# Patient Record
Sex: MALE | Race: WHITE | NOT HISPANIC OR LATINO
[De-identification: names, ages, dates, MRNs, and addresses within clinical notes are randomized per-mention and may not be internally consistent; named-entity substitution may affect disease eponyms.]

---

## 2017-03-12 ENCOUNTER — TRANSCRIPTION ENCOUNTER (OUTPATIENT)
Age: 47
End: 2017-03-12

## 2020-01-22 ENCOUNTER — TRANSCRIPTION ENCOUNTER (OUTPATIENT)
Age: 50
End: 2020-01-22

## 2020-11-21 ENCOUNTER — TRANSCRIPTION ENCOUNTER (OUTPATIENT)
Age: 50
End: 2020-11-21

## 2021-05-23 ENCOUNTER — TRANSCRIPTION ENCOUNTER (OUTPATIENT)
Age: 51
End: 2021-05-23

## 2022-05-18 ENCOUNTER — NON-APPOINTMENT (OUTPATIENT)
Age: 52
End: 2022-05-18

## 2022-10-12 ENCOUNTER — APPOINTMENT (OUTPATIENT)
Dept: ORTHOPEDIC SURGERY | Facility: CLINIC | Age: 52
End: 2022-10-12

## 2022-10-12 VITALS — HEIGHT: 70 IN | BODY MASS INDEX: 27.49 KG/M2 | WEIGHT: 192 LBS

## 2022-10-12 DIAGNOSIS — M70.41 PREPATELLAR BURSITIS, RIGHT KNEE: ICD-10-CM

## 2022-10-12 DIAGNOSIS — Z78.9 OTHER SPECIFIED HEALTH STATUS: ICD-10-CM

## 2022-10-12 PROBLEM — Z00.00 ENCOUNTER FOR PREVENTIVE HEALTH EXAMINATION: Status: ACTIVE | Noted: 2022-10-12

## 2022-10-12 PROCEDURE — 73564 X-RAY EXAM KNEE 4 OR MORE: CPT | Mod: RT

## 2022-10-12 PROCEDURE — 99203 OFFICE O/P NEW LOW 30 MIN: CPT

## 2022-10-12 RX ORDER — CEPHALEXIN 500 MG/1
500 CAPSULE ORAL TWICE DAILY
Qty: 14 | Refills: 0 | Status: COMPLETED | COMMUNITY
Start: 2022-10-12 | End: 2022-10-19

## 2022-10-12 NOTE — IMAGING
[de-identified] : Small anterior warmth and erythema\par Anterior tenderness to palpation\par Range of motion 0-140; anterior pain with flexion; tight hamstrings\par 5/5 quadriceps and hamstring strength\par Negative Lachman, negative Armand, negative patella apprehension\par Motor and sensory intact distally\par Non-antalgic gait\par  [Right] : right knee [All Views] : anteroposterior, lateral, skyline, and anteroposterior standing [There are no fractures, subluxations or dislocations. No significant abnormalities are seen] : There are no fractures, subluxations or dislocations. No significant abnormalities are seen

## 2022-10-12 NOTE — ASSESSMENT
[FreeTextEntry1] : EVOLVING SEPTIC PREPATELLAR BURSITIS/CELLULITIS X 2 DAYS\par NO SYSTEMIC SYMPTOMS\par COURSE OF KEFLEX X 7 DAYS AND F/U THEN\par ADVISED TO PROCEED TO ER IF DEVELOPS WORSENING REDNESS OR SYSTEMIC SYMPTOMS

## 2022-10-12 NOTE — HISTORY OF PRESENT ILLNESS
[5] : 5 [9] : 9 [Burning] : burning [Dull/Aching] : dull/aching [Tightness] : tightness [Constant] : constant [Household chores] : household chores [Leisure] : leisure [Work] : work [Meds] : meds [Walking/activity] : walking/activity [Sitting] : sitting [] : no [FreeTextEntry1] : rt knee [FreeTextEntry5] : Pt is here for rt knee pain. Pt stated he runs on the treadmill once a week. He stated he ran on the treadmill on 10/07/22, and on 10/10/22, woke up with knee pain. Pt states pain is a burning sensation, and feels tight. Pain is anterior. Pt states no numbness, tingling, or swelling. Pt states no prior injury to rt knee. No history of sx to rt knee. Pt tried nsaids, and patches for pain.

## 2022-10-19 ENCOUNTER — APPOINTMENT (OUTPATIENT)
Dept: ORTHOPEDIC SURGERY | Facility: CLINIC | Age: 52
End: 2022-10-19

## 2023-02-24 ENCOUNTER — NON-APPOINTMENT (OUTPATIENT)
Age: 53
End: 2023-02-24

## 2023-05-17 ENCOUNTER — NON-APPOINTMENT (OUTPATIENT)
Age: 53
End: 2023-05-17

## 2023-08-31 ENCOUNTER — NON-APPOINTMENT (OUTPATIENT)
Age: 53
End: 2023-08-31

## 2023-12-18 ENCOUNTER — NON-APPOINTMENT (OUTPATIENT)
Age: 53
End: 2023-12-18

## 2024-02-10 ENCOUNTER — NON-APPOINTMENT (OUTPATIENT)
Age: 54
End: 2024-02-10

## 2024-04-12 ENCOUNTER — NON-APPOINTMENT (OUTPATIENT)
Age: 54
End: 2024-04-12

## 2024-05-31 ENCOUNTER — NON-APPOINTMENT (OUTPATIENT)
Age: 54
End: 2024-05-31

## 2024-07-17 ENCOUNTER — NON-APPOINTMENT (OUTPATIENT)
Age: 54
End: 2024-07-17

## 2024-12-04 ENCOUNTER — NON-APPOINTMENT (OUTPATIENT)
Age: 54
End: 2024-12-04